# Patient Record
Sex: FEMALE | Race: WHITE | NOT HISPANIC OR LATINO | Employment: FULL TIME | URBAN - METROPOLITAN AREA
[De-identification: names, ages, dates, MRNs, and addresses within clinical notes are randomized per-mention and may not be internally consistent; named-entity substitution may affect disease eponyms.]

---

## 2024-04-25 ENCOUNTER — OFFICE VISIT (OUTPATIENT)
Dept: OBSTETRICS AND GYNECOLOGY | Facility: CLINIC | Age: 74
End: 2024-04-25
Payer: MEDICARE

## 2024-04-25 VITALS
SYSTOLIC BLOOD PRESSURE: 132 MMHG | WEIGHT: 195 LBS | DIASTOLIC BLOOD PRESSURE: 80 MMHG | BODY MASS INDEX: 31.34 KG/M2 | HEIGHT: 66 IN

## 2024-04-25 DIAGNOSIS — N89.8 VAGINAL DISCHARGE: ICD-10-CM

## 2024-04-25 DIAGNOSIS — N81.2 SECOND DEGREE UTERINE PROLAPSE: Primary | ICD-10-CM

## 2024-04-25 DIAGNOSIS — N81.4 CYSTOCELE WITH PROLAPSE: ICD-10-CM

## 2024-04-25 DIAGNOSIS — N81.4 CYSTOCELE WITH UTERINE PROLAPSE: ICD-10-CM

## 2024-04-25 PROCEDURE — 99204 OFFICE O/P NEW MOD 45 MIN: CPT | Performed by: OBSTETRICS & GYNECOLOGY

## 2024-04-25 PROCEDURE — 1159F MED LIST DOCD IN RCRD: CPT | Performed by: OBSTETRICS & GYNECOLOGY

## 2024-04-25 PROCEDURE — 1036F TOBACCO NON-USER: CPT | Performed by: OBSTETRICS & GYNECOLOGY

## 2024-04-25 PROCEDURE — 87205 SMEAR GRAM STAIN: CPT

## 2024-04-25 RX ORDER — METOPROLOL TARTRATE 25 MG/1
25 TABLET, FILM COATED ORAL 2 TIMES DAILY
COMMUNITY
Start: 2024-04-25

## 2024-04-25 RX ORDER — SIROLIMUS 1 MG/1
1 TABLET, FILM COATED ORAL
COMMUNITY
Start: 2023-11-29 | End: 2024-11-28

## 2024-04-25 RX ORDER — TACROLIMUS 0.5 MG/1
0.5 CAPSULE ORAL 2 TIMES DAILY
COMMUNITY
Start: 2023-11-29

## 2024-04-25 RX ORDER — VALSARTAN 160 MG/1
160 TABLET ORAL DAILY
COMMUNITY
Start: 2024-01-24

## 2024-04-25 RX ORDER — MECLIZINE HYDROCHLORIDE 25 MG/1
25 TABLET ORAL
COMMUNITY
Start: 2023-08-31

## 2024-04-25 RX ORDER — LOSARTAN POTASSIUM 50 MG/1
50 TABLET ORAL DAILY
COMMUNITY
Start: 2023-08-31 | End: 2024-04-25 | Stop reason: ALTCHOICE

## 2024-04-25 NOTE — PROGRESS NOTES
"SUBJECTIVE  Sandra Winter is a 73 y.o. female here for New GYN visit  C/o lump in vulvar area for one week, non tender, states that   Denies any urinary or problems with bowel movements  C/o increasing vaginal discharge, denies any itching or irritation  H/o liver transplant in 2019, secondary to Hepatitis  C    Gyn:   Menstrual Pattern:   STIs: denies  Sexual Activity: men, monogamous, no complaints  Contraception: None    [unfilled]  Past Medical History:   Diagnosis Date    Hypertension     Liver transplant planned       Past Surgical History:   Procedure Laterality Date    CHOLECYSTECTOMY      LIVER TRANSPLANTATION      LUNG LOBECTOMY Right         OBJECTIVE  /80   Ht 1.676 m (5' 6\")   Wt 88.5 kg (195 lb)   BMI 31.47 kg/m²      General:   Alert and oriented, in no acute distress   Neck: Supple. No visible thyromegaly.    Breast/Axilla: Normal to palpation bilaterally without masses, skin changes, or nipple discharge.    Abdomen: Soft, non-tender, without masses or organomegaly   Vulva: Normal architecture without erythema, masses, or lesions.    Vagina: Normal mucosa without lesions, masses, or atrophy.White vaginal discharge noted   Cervix: Normal without masses, lesions, or signs of cervicitis.    Uterus: Normal mobile, non-enlarged uterus    Adnexa: Normal without masses or lesions   Pelvic Floor Second degree uterine prolapse with cystocele noted   Psych Normal affect. Normal mood.      Problem List Items Addressed This Visit       Cystocele with prolapse    Second degree uterine prolapse - Primary    Vaginal discharge    Relevant Orders    Vaginitis Gram Stain For Bacterial Vaginosis + Yeast     Other Visit Diagnoses       Cystocele with uterine prolapse        Relevant Orders    Referral to Urogynecology           IMPRESSIONS:  Vaginal Gram stain ordered  Asymptomatic second-degree uterine prolapse with cystocele  Discussed management options, including pelvic floor therapy, pessary and surgical " intervention.  Patient would like to pursue pelvic floor therapy, referral for urogynecology done.  Discussed that patient is a poor surgical candidate due to her prior history of liver transplant  More than 50% of time was utilized for counseling    There are no diagnoses linked to this encounter.     Mercedes River MD

## 2024-04-26 LAB
CLUE CELLS VAG LPF-#/AREA: NORMAL /[LPF]
NUGENT SCORE: 1
YEAST VAG WET PREP-#/AREA: NORMAL